# Patient Record
Sex: MALE | Race: WHITE | NOT HISPANIC OR LATINO | URBAN - METROPOLITAN AREA
[De-identification: names, ages, dates, MRNs, and addresses within clinical notes are randomized per-mention and may not be internally consistent; named-entity substitution may affect disease eponyms.]

---

## 2023-03-14 ENCOUNTER — OFFICE (OUTPATIENT)
Dept: URBAN - METROPOLITAN AREA CLINIC 67 | Facility: CLINIC | Age: 58
End: 2023-03-14

## 2023-03-14 VITALS
RESPIRATION RATE: 14 BRPM | HEIGHT: 73 IN | DIASTOLIC BLOOD PRESSURE: 84 MMHG | SYSTOLIC BLOOD PRESSURE: 132 MMHG | HEART RATE: 93 BPM | WEIGHT: 193 LBS

## 2023-03-14 DIAGNOSIS — Z85.47 PERSONAL HISTORY OF MALIGNANT NEOPLASM OF TESTIS: ICD-10-CM

## 2023-03-14 DIAGNOSIS — Z72.0 TOBACCO USE: ICD-10-CM

## 2023-03-14 DIAGNOSIS — R10.10 UPPER ABDOMINAL PAIN, UNSPECIFIED: ICD-10-CM

## 2023-03-14 DIAGNOSIS — Z85.528 PERSONAL HISTORY OF OTHER MALIGNANT NEOPLASM OF KIDNEY: ICD-10-CM

## 2023-03-14 DIAGNOSIS — R93.5 ABNORMAL FINDINGS ON DIAGNOSTIC IMAGING OF OTHER ABDOMINAL R: ICD-10-CM

## 2023-03-14 DIAGNOSIS — Q45.3 OTHER CONGENITAL MALFORMATIONS OF PANCREAS AND PANCREATIC DU: ICD-10-CM

## 2023-03-14 PROCEDURE — 99204 OFFICE O/P NEW MOD 45 MIN: CPT | Performed by: SPECIALIST

## 2023-03-14 NOTE — SERVICEHPINOTES
Pankaj Post   is seen for an initial visit today.    Pt has chronic pain from back and neuropathy and has been unwell since Sept and loosing weight Pt went to ER for pain and CT reveals 3.5 x 3.4 cm pancreatic mass surrounding SMV, Celiac artery, and SMA. Irregular dilated pancreatic duct. Prior left nephrectomy with multiple mesenteric lymph nodes up to 1 cm.brLabs:brBun/CR 10 /1.28 AST18, ALT 44 Alk phos 145 and T.B .3
br
br
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

## 2023-03-14 NOTE — SERVICENOTES
Pt will need to discuss pain medication needs with his PCP/Pain medication prescriber.  Supportive daughter and wife.  Given strong hx cancer and CT I feel sure he has pancreatic cancer which appear unresectable.  EUS best option for tissue diagnosis and staging.  Will likely need oncologist and genetic testing